# Patient Record
Sex: FEMALE | Race: WHITE | Employment: FULL TIME | URBAN - METROPOLITAN AREA
[De-identification: names, ages, dates, MRNs, and addresses within clinical notes are randomized per-mention and may not be internally consistent; named-entity substitution may affect disease eponyms.]

---

## 2019-10-29 ENCOUNTER — HOSPITAL ENCOUNTER (EMERGENCY)
Age: 54
Discharge: HOME | End: 2019-10-29
Attending: STUDENT IN AN ORGANIZED HEALTH CARE EDUCATION/TRAINING PROGRAM
Payer: COMMERCIAL

## 2019-10-29 VITALS
BODY MASS INDEX: 30.53 KG/M2 | HEART RATE: 66 BPM | SYSTOLIC BLOOD PRESSURE: 122 MMHG | WEIGHT: 190 LBS | RESPIRATION RATE: 16 BRPM | HEIGHT: 66 IN | OXYGEN SATURATION: 98 % | TEMPERATURE: 97.8 F | DIASTOLIC BLOOD PRESSURE: 62 MMHG

## 2019-10-29 DIAGNOSIS — M62.830 LUMBAR PARASPINAL MUSCLE SPASM: Primary | ICD-10-CM

## 2019-10-29 LAB
APPEARANCE UR: CLEAR
BILIRUB UR QL: NEGATIVE
COLOR UR: NORMAL
COMMENT, HOLDF: NORMAL
GLUCOSE UR STRIP.AUTO-MCNC: NEGATIVE MG/DL
HGB UR QL STRIP: NEGATIVE
KETONES UR QL STRIP.AUTO: NEGATIVE MG/DL
LEUKOCYTE ESTERASE UR QL STRIP.AUTO: NEGATIVE
NITRITE UR QL STRIP.AUTO: NEGATIVE
PH UR STRIP: 6 [PH] (ref 5–8)
PROT UR STRIP-MCNC: NEGATIVE MG/DL
SAMPLES BEING HELD,HOLD: NORMAL
SP GR UR REFRACTOMETRY: 1.01 (ref 1–1.03)
UR CULT HOLD, URHOLD: NORMAL
UROBILINOGEN UR QL STRIP.AUTO: 0.2 EU/DL (ref 0.2–1)

## 2019-10-29 PROCEDURE — 74011250636 HC RX REV CODE- 250/636: Performed by: STUDENT IN AN ORGANIZED HEALTH CARE EDUCATION/TRAINING PROGRAM

## 2019-10-29 PROCEDURE — 74011000250 HC RX REV CODE- 250: Performed by: STUDENT IN AN ORGANIZED HEALTH CARE EDUCATION/TRAINING PROGRAM

## 2019-10-29 PROCEDURE — 99285 EMERGENCY DEPT VISIT HI MDM: CPT

## 2019-10-29 PROCEDURE — 81003 URINALYSIS AUTO W/O SCOPE: CPT

## 2019-10-29 PROCEDURE — 74011250637 HC RX REV CODE- 250/637: Performed by: STUDENT IN AN ORGANIZED HEALTH CARE EDUCATION/TRAINING PROGRAM

## 2019-10-29 PROCEDURE — 96374 THER/PROPH/DIAG INJ IV PUSH: CPT

## 2019-10-29 RX ORDER — ACETAMINOPHEN 500 MG
1000 TABLET ORAL
Status: COMPLETED | OUTPATIENT
Start: 2019-10-29 | End: 2019-10-29

## 2019-10-29 RX ORDER — LIDOCAINE 4 G/100G
1 PATCH TOPICAL EVERY 24 HOURS
Status: DISCONTINUED | OUTPATIENT
Start: 2019-10-29 | End: 2019-10-29 | Stop reason: HOSPADM

## 2019-10-29 RX ORDER — KETOROLAC TROMETHAMINE 30 MG/ML
15 INJECTION, SOLUTION INTRAMUSCULAR; INTRAVENOUS
Status: COMPLETED | OUTPATIENT
Start: 2019-10-29 | End: 2019-10-29

## 2019-10-29 RX ORDER — DIAZEPAM 5 MG/1
5 TABLET ORAL
Status: COMPLETED | OUTPATIENT
Start: 2019-10-29 | End: 2019-10-29

## 2019-10-29 RX ORDER — METHOCARBAMOL 750 MG/1
750 TABLET, FILM COATED ORAL ONCE
Status: COMPLETED | OUTPATIENT
Start: 2019-10-29 | End: 2019-10-29

## 2019-10-29 RX ORDER — LIDOCAINE 50 MG/G
PATCH TOPICAL
Qty: 10 EACH | Refills: 0 | Status: SHIPPED | OUTPATIENT
Start: 2019-10-29

## 2019-10-29 RX ORDER — NAPROXEN 500 MG/1
500 TABLET ORAL 2 TIMES DAILY WITH MEALS
Qty: 20 TAB | Refills: 0 | Status: SHIPPED | OUTPATIENT
Start: 2019-10-29 | End: 2019-11-08

## 2019-10-29 RX ORDER — METHOCARBAMOL 500 MG/1
500 TABLET, FILM COATED ORAL 3 TIMES DAILY
Qty: 9 TAB | Refills: 0 | Status: SHIPPED | OUTPATIENT
Start: 2019-10-29 | End: 2019-11-01

## 2019-10-29 RX ADMIN — DIAZEPAM 5 MG: 5 TABLET ORAL at 14:48

## 2019-10-29 RX ADMIN — KETOROLAC TROMETHAMINE 15 MG: 30 INJECTION, SOLUTION INTRAMUSCULAR at 13:40

## 2019-10-29 RX ADMIN — METHOCARBAMOL TABLETS 750 MG: 750 TABLET, COATED ORAL at 13:39

## 2019-10-29 RX ADMIN — ACETAMINOPHEN 1000 MG: 500 TABLET ORAL at 13:39

## 2019-10-29 NOTE — DISCHARGE INSTRUCTIONS
RETURN IF WORSENING PAIN, TROUBLE HOLDING STOOL/URINE, RADIATION OF PAIN, OR WEAKNESS/NUMBNESS    DO NOT TAKE ROBAXIN WHEN WORKING OR DRIVING AS IT CAN CAUSE SEDATION

## 2019-10-29 NOTE — ED PROVIDER NOTES
48-year-old female who is relatively healthy presents today with right-sided back pain. She reports that over the past 3 weeks she has had intermittent right upper and right lower back pain that she first noticed after lifting something off the ground. She is been taking ibuprofen and using icy hot with some relief. This morning she felt like she had some small spasms occurring in the right upper back. She was at work this morning around 10:30 AM and when she went from sitting to standing had sudden onset of which she calls a \"grabbing\" pain in her right lower back. She let herself onto the floor without falling and is unable to get up due to pain. When EMS arrived they had to assist her into the cot. She reports severe pain at this time, it does not radiate. Denies any recent urinary symptoms or vaginal bleeding. No weakness or numbness in the legs. No incontinence. No history of surgery to the back. No history of malignancy. Social: , works in a school                 ALLERGIES: Patient has no known allergies. Review of Systems   Constitutional: Negative for chills and fever. HENT: Negative for congestion and rhinorrhea. Eyes: Negative for redness and visual disturbance. Respiratory: Negative for cough and shortness of breath. Cardiovascular: Negative for chest pain and leg swelling. Gastrointestinal: Negative for abdominal pain, diarrhea, nausea and vomiting. Genitourinary: Negative for dysuria, flank pain, frequency, hematuria and urgency. Musculoskeletal: Positive for back pain. Negative for arthralgias, myalgias and neck pain. Skin: Negative for rash and wound. Allergic/Immunologic: Negative for immunocompromised state. Neurological: Negative for dizziness and headaches.        Vitals:    10/29/19 1329   BP: 129/66   Pulse: 66   Resp: 16   Temp: 97.8 °F (36.6 °C)   SpO2: 98%   Weight: 86.2 kg (190 lb)   Height: 5' 6\" (1.676 m)            Physical Exam Constitutional: She is oriented to person, place, and time. She appears well-developed and well-nourished. No distress. HENT:   Head: Normocephalic. Mouth/Throat: Oropharynx is clear and moist. No oropharyngeal exudate. Eyes: Pupils are equal, round, and reactive to light. EOM are normal. Right eye exhibits no discharge. Left eye exhibits no discharge. Neck: Normal range of motion. Neck supple. Cardiovascular: Normal rate, regular rhythm, normal heart sounds and intact distal pulses. Exam reveals no gallop and no friction rub. No murmur heard. Pulmonary/Chest: Effort normal and breath sounds normal. No stridor. No respiratory distress. She has no wheezes. She has no rales. Abdominal: Soft. Bowel sounds are normal. She exhibits no distension. There is no tenderness. There is no rebound and no guarding. Musculoskeletal: Normal range of motion. She exhibits no edema or deformity. Tenderness with spasm to the R lumbar paraspinal musculature without midline tenderness. Appropriate strength in bilateral lower extremities. Sensation intact. Neurological: She is alert and oriented to person, place, and time. Skin: Skin is warm and dry. Capillary refill takes less than 2 seconds. No rash noted. She is not diaphoretic. Psychiatric: She has a normal mood and affect. Her behavior is normal.   Nursing note and vitals reviewed. Course: Toradol and Robaxin given with minimal relief    Valium PO given    4:35 PM re-evaluated. Feeling much better. Pain mild    Lidoderm given    On re-eval patient ambulating without difficulty and feeling much better    MDM:    53 y.o. female here with pain in her R lower back that started when going from sitting to standing. No urinary symptoms or abd pain to suggest appendicitis, renal stone, or other intra-abd pathology. No midline tenderness or hx of surgery/weakness/incontinence to suggest epidural abscess. No weakness, numbness to suggest cauda equina.  Feeling much better after medicated as above. Given rx for robaxin, naprosyn, and lidoderm with instruction to not use robaxin while working or driving. Given return instructions.            Clinical Impression:     ICD-10-CM ICD-9-CM    1. Lumbar paraspinal muscle spasm M62.830 724.8            Disposition: VASYL Vazquez, DO

## 2019-10-29 NOTE — LETTER
[address] 
[facility] EMERGENCY DEPT 
[address] [place] [id number] [phone] Work/School Note Date: 10/29/2019 To Whom It May concern: 
 
Angy Ho was seen and treated today in the emergency room by the following provider(s): 
Attending Provider: Kailee Herrera DO. Angy Ho may return to work on 10/31/19.  
 
Sincerely, 
 
 
 
 
Rogerio Vazquez DO

## 2019-10-29 NOTE — ED NOTES
Patient is standing at bedside with her spouse. Mentioned that she has been having gallbladder issues and is scheduled for its removal in a few weeks.